# Patient Record
Sex: FEMALE | Race: WHITE | URBAN - METROPOLITAN AREA
[De-identification: names, ages, dates, MRNs, and addresses within clinical notes are randomized per-mention and may not be internally consistent; named-entity substitution may affect disease eponyms.]

---

## 2024-10-13 ENCOUNTER — OFFICE VISIT (OUTPATIENT)
Age: 24
End: 2024-10-13

## 2024-10-13 VITALS
HEIGHT: 67 IN | WEIGHT: 251.6 LBS | TEMPERATURE: 97.7 F | DIASTOLIC BLOOD PRESSURE: 77 MMHG | HEART RATE: 80 BPM | RESPIRATION RATE: 18 BRPM | SYSTOLIC BLOOD PRESSURE: 120 MMHG | BODY MASS INDEX: 39.49 KG/M2 | OXYGEN SATURATION: 98 %

## 2024-10-13 DIAGNOSIS — L02.419 AXILLARY ABSCESS: Primary | ICD-10-CM

## 2024-10-13 RX ORDER — CEPHALEXIN 500 MG/1
500 CAPSULE ORAL 4 TIMES DAILY
Qty: 28 CAPSULE | Refills: 0 | Status: SHIPPED | OUTPATIENT
Start: 2024-10-13 | End: 2024-10-20

## 2024-10-16 ENCOUNTER — TELEPHONE (OUTPATIENT)
Dept: SURGERY | Age: 24
End: 2024-10-16

## 2024-10-16 NOTE — TELEPHONE ENCOUNTER
Spoke with patient to notify if a procedure is done in office she will still be able to drive home afterwards. Also A work note will be provided at the time of visit. Patient stated understanding.

## 2024-10-16 NOTE — TELEPHONE ENCOUNTER
Patient wanting to know if Dr. Dooley will do a procedure on her abscess tomorrow. She is worried about driving since it is under her arm. Please call and advise.    Patient also states that she will need a letter for work.

## 2024-10-17 ENCOUNTER — INITIAL CONSULT (OUTPATIENT)
Dept: SURGERY | Age: 24
End: 2024-10-17

## 2024-10-17 VITALS
BODY MASS INDEX: 39.39 KG/M2 | HEART RATE: 78 BPM | WEIGHT: 251 LBS | DIASTOLIC BLOOD PRESSURE: 79 MMHG | HEIGHT: 67 IN | SYSTOLIC BLOOD PRESSURE: 115 MMHG

## 2024-10-17 DIAGNOSIS — L02.412 ABSCESS OF AXILLA, LEFT: Primary | ICD-10-CM

## 2024-10-17 NOTE — PROGRESS NOTES
Marysol Aponte (:  2000) is a 24 y.o. female,New patient, here for evaluation of the following chief complaint(s):  Surgical Consult (Left axillary abscess)         Assessment & Plan  Abscess of axilla, left       Orders:    DRAIN SKIN ABSCESS COMPLIC    Follow up  next week for wound check     Subjective   HPI  Chief Complaint: abscess    Patient presents for evaluation of an abscess. Patient reports symptoms of pain, redness and swelling. Location of symptoms is left axilla. Symptoms were first noted a week ago. Previous evaluation includes urgent care with I&D and antibiotics. Noted the abscess drainage initially but has now recurred. Patient has a history of previous right sided issues but none currently. Will plan following treatment: repeat I&D today. The risks, benefits and alternatives to the planned procedure were discussed. Patient expressed an understanding and is willing to proceed.    Procedure  Incision and drainage done in office today. Under sterile conditions,  local anesthesia infused and a 1.5 cm incision made over point of fluctuance located left axilla. Purulence drained, wound packed with guaze. Dressing applied. No immediate complications. Patient instructed to remove packing in AM then cover with dry dressing.          Review of Systems       Objective   Physical Exam       Electronically signed by ANGEL COX MD on 10/17/2024 at 7:07 PM        An electronic signature was used to authenticate this note.    --ANGEL COX MD